# Patient Record
Sex: FEMALE | Race: OTHER | Employment: FULL TIME | URBAN - METROPOLITAN AREA
[De-identification: names, ages, dates, MRNs, and addresses within clinical notes are randomized per-mention and may not be internally consistent; named-entity substitution may affect disease eponyms.]

---

## 2023-11-13 ENCOUNTER — OFFICE VISIT (OUTPATIENT)
Age: 38
End: 2023-11-13

## 2023-11-13 VITALS
SYSTOLIC BLOOD PRESSURE: 111 MMHG | OXYGEN SATURATION: 96 % | HEART RATE: 94 BPM | HEIGHT: 63 IN | RESPIRATION RATE: 18 BRPM | DIASTOLIC BLOOD PRESSURE: 81 MMHG | BODY MASS INDEX: 34.23 KG/M2 | TEMPERATURE: 98.6 F | WEIGHT: 193.2 LBS

## 2023-11-13 DIAGNOSIS — R09.89 UPPER RESPIRATORY SYMPTOM: Primary | ICD-10-CM

## 2023-11-13 LAB
INFLUENZA A ANTIGEN, POC: NEGATIVE
INFLUENZA B ANTIGEN, POC: NEGATIVE
VALID INTERNAL CONTROL, POC: NORMAL

## 2023-11-13 RX ORDER — FEXOFENADINE HCL 180 MG/1
180 TABLET ORAL DAILY
Qty: 30 TABLET | Refills: 0 | Status: SHIPPED | OUTPATIENT
Start: 2023-11-13 | End: 2023-12-13

## 2023-11-13 RX ORDER — FLUTICASONE PROPIONATE 50 MCG
2 SPRAY, SUSPENSION (ML) NASAL DAILY
Qty: 16 G | Refills: 0 | Status: SHIPPED | OUTPATIENT
Start: 2023-11-13

## 2023-11-13 RX ORDER — DEXTROMETHORPHAN POLISTIREX 30 MG/5ML
60 SUSPENSION ORAL 2 TIMES DAILY PRN
Qty: 89 ML | Refills: 0 | Status: SHIPPED | OUTPATIENT
Start: 2023-11-13 | End: 2023-11-23

## 2023-11-13 RX ORDER — BENZONATATE 200 MG/1
200 CAPSULE ORAL 2 TIMES DAILY PRN
Qty: 10 CAPSULE | Refills: 0 | Status: SHIPPED | OUTPATIENT
Start: 2023-11-13 | End: 2023-11-18

## 2023-11-13 NOTE — PROGRESS NOTES
Subjective:       History was provided by the patient. Dale Marti is a 42 y.o. female who presents for evaluation of symptoms of a URI. Symptoms include fevers up to 102.2 degrees, chills, hot and cold spells, and sweats, chills, dry cough, headache, myalgias, clear nasal discharge, sore throat, and swollen glands. Onset of symptoms was 4 weeks ago, gradually worsening since that time. Associated symptoms include bilateral ear pressure/pain, congestion, low grade fever, non productive cough, and sore throat. She is drinking moderate amounts of fluids. Evaluation to date: none. Treatment to date: Mucinex, Sudafed, Nyquil  Patient's medications, allergies, past medical, surgical, social and family histories were reviewed and updated as appropriate. Review of Systems  Pertinent items are noted in HPI. Objective:      General appearance: alert, appears stated age, and cooperative  Ears: normal TM's and external ear canals both ears  Nose: no discharge, turbinates normal, no sinus tenderness  Throat: abnormal findings: mild oropharyngeal erythema  Lungs: clear to auscultation bilaterally  Heart: S1, S2 normal  Lymph nodes: Cervical adenopathy: tenderness and swelling. Assessment:      viral upper respiratory illness   Results for orders placed or performed in visit on 11/13/23   AMB POC INFLUENZA A  AND B REAL-TIME RT-PCR   Result Value Ref Range    Valid Internal Control, POC Y     Influenza A Antigen, POC Negative Negative    Influenza B Antigen, POC Negative Negative         Plan:      Discussed dx and tx of URIs  Suggested symptomatic OTC remedies. Nasal saline sprays for congestion. RTC prn.   1. Handout given with care instructions. 2.OTC for symptom management. Increase fluid intake. 3. Follow-up with PCP as needed. 4.Go to ED with development of any acute symptoms. 5.Return if symptoms worsen or fail to improve.      Follow-up immediately for any new worsening or changes or if

## 2025-02-18 ENCOUNTER — NURSE TRIAGE (OUTPATIENT)
Dept: OTHER | Facility: CLINIC | Age: 40
End: 2025-02-18

## 2025-02-18 NOTE — TELEPHONE ENCOUNTER
PT REPORTS (2) COVID POSITIVE TEST X TODAY. REPORTS NEW ONSET OF MILD S/S X YESTERDAY. PT DENIES SPECIFIC CONCERNS, REPORTS \"OVERALL FEELING RUN DOWN\" REPORTS OF MILD SORE THROAT, \"SOME BODY ACHES\", A \"LITTLE COUGH AND SOME CONGESTION\". DENIES FEVERS, CP, SOB, NVD. DENIES PERTINENT MEDICAL HX. PT WAS EDUCATED ON HOME CARE FOR FEVERS, SORE THROAT AND DOSING INSTRUCTIONS FOR TYLENOL AND MOTRIN. PT WAS INSTRUCTED ON PARAMETERS FOR WHEN TO CALL BACK WITH QUESTIONS OR CONCERNS. PT WAS AGREEABLE TO DISPOSITION AND VERBALIZED UNDERSTANDING.      Reason for Disposition   [1] COVID-19 exposure AND [2] no symptoms   [1] Symptoms of COVID-19 (e.g., cough, fever, SOB, or others) AND [2] COVID-19 is widespread in the community    Protocols used: COVID-19 - Diagnosed or Suspected-ADULT-AH, COVID-19 - Exposure-ADULT-AH  Location of patient: [place]    Subjective: Caller states \"I TESTED POSITIVE TWICE TODAY FOR COVID 19 AND THEY SENT ME HOME FROM WORK\"     Current Symptoms: AS DESCRIBED ABOVE .     Onset: 1 day ago; gradual      Recommended disposition: Home Care    Care advice provided, patient verbalizes understanding; denies any other questions or concerns; instructed to call back for any new or worsening symptoms.    Attention Provider:  Thank you for allowing me to participate in the care of your patient.  The patient was connected to triage in response to symptoms provided.   Please do not respond through this encounter as the response is not directed to a shared pool.

## 2025-09-02 ENCOUNTER — NURSE TRIAGE (OUTPATIENT)
Dept: OTHER | Facility: CLINIC | Age: 40
End: 2025-09-02

## 2025-09-02 ENCOUNTER — OFFICE VISIT (OUTPATIENT)
Age: 40
End: 2025-09-02

## 2025-09-02 VITALS
OXYGEN SATURATION: 97 % | TEMPERATURE: 98.6 F | SYSTOLIC BLOOD PRESSURE: 118 MMHG | DIASTOLIC BLOOD PRESSURE: 88 MMHG | WEIGHT: 201 LBS | BODY MASS INDEX: 36.76 KG/M2 | RESPIRATION RATE: 18 BRPM | HEART RATE: 101 BPM

## 2025-09-02 DIAGNOSIS — R00.2 INTERMITTENT PALPITATIONS: Primary | ICD-10-CM

## 2025-09-02 DIAGNOSIS — R03.0 ELEVATED BLOOD PRESSURE READING IN OFFICE WITHOUT DIAGNOSIS OF HYPERTENSION: ICD-10-CM
